# Patient Record
Sex: MALE | Race: WHITE | NOT HISPANIC OR LATINO | URBAN - METROPOLITAN AREA
[De-identification: names, ages, dates, MRNs, and addresses within clinical notes are randomized per-mention and may not be internally consistent; named-entity substitution may affect disease eponyms.]

---
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
## 2022-06-03 ENCOUNTER — EMERGENCY (EMERGENCY)
Facility: HOSPITAL | Age: 22
LOS: 1 days | Discharge: LEFT BEFORE DISCHARGE | End: 2022-06-03
Attending: STUDENT IN AN ORGANIZED HEALTH CARE EDUCATION/TRAINING PROGRAM | Admitting: STUDENT IN AN ORGANIZED HEALTH CARE EDUCATION/TRAINING PROGRAM
Payer: COMMERCIAL

## 2022-06-03 VITALS
WEIGHT: 220.02 LBS | RESPIRATION RATE: 16 BRPM | DIASTOLIC BLOOD PRESSURE: 76 MMHG | HEIGHT: 72 IN | SYSTOLIC BLOOD PRESSURE: 120 MMHG | TEMPERATURE: 98 F | HEART RATE: 76 BPM | OXYGEN SATURATION: 99 %

## 2022-06-03 PROCEDURE — L9991: CPT

## 2022-06-03 NOTE — ED ADULT NURSE NOTE - CHIEF COMPLAINT QUOTE
Patient fell in his backyard sustained laceration to left lower leg went to [facility] but the wait is long thus went to [place]

## 2022-06-03 NOTE — ED ADULT NURSE NOTE - OBJECTIVE STATEMENT
Stated he tripped and fall in backyard today , Hit the concrete, and sustained a  laceration left lower leg. Mom stated it was bleeding. She palced a dressing on there, and brought him to ED. He denies pain. Last tetanus was 5 years ago. Denies lightheadedness or dizziness

## 2022-06-03 NOTE — ED ADULT NURSE REASSESSMENT NOTE - NS ED NURSE REASSESS COMMENT FT1
Left without being seen> Unable to obtain vital signs. Mother stated she was not willing to wait for provider. Patient last observed resting comfortably, not in any acute distress, and there was no bleeding at the site of injury. Dressing he came in with applied by mother was dry and intact.

## 2022-06-03 NOTE — ED ADULT TRIAGE NOTE - CHIEF COMPLAINT QUOTE
Patient fell in his backyard sustained laceration to left lower leg went to [facility] but the wait is long thus went to [place]

## 2022-06-03 NOTE — ED ADULT NURSE NOTE - EXPLANATION OF PATIENT'S REASON FOR LEAVING
Mother stated she has to work, Son has to work, waiting too long for provider and can no longer wait. Left without Vitals

## 2024-06-05 NOTE — ED ADULT NURSE NOTE - NS ED NURSE DISCH DISPOSITION
This encounter was created for [facility] orders only .   
Left without being seen (saw a nurse but never saw a physician or midlevel provider)